# Patient Record
Sex: MALE | Race: WHITE
[De-identification: names, ages, dates, MRNs, and addresses within clinical notes are randomized per-mention and may not be internally consistent; named-entity substitution may affect disease eponyms.]

---

## 2018-09-23 ENCOUNTER — HOSPITAL ENCOUNTER (EMERGENCY)
Dept: HOSPITAL 25 - UCEAST | Age: 33
Discharge: HOME | End: 2018-09-23
Payer: COMMERCIAL

## 2018-09-23 VITALS — SYSTOLIC BLOOD PRESSURE: 138 MMHG | DIASTOLIC BLOOD PRESSURE: 98 MMHG

## 2018-09-23 DIAGNOSIS — H57.11: Primary | ICD-10-CM

## 2018-09-23 PROCEDURE — G0463 HOSPITAL OUTPT CLINIC VISIT: HCPCS

## 2018-09-23 PROCEDURE — 99212 OFFICE O/P EST SF 10 MIN: CPT

## 2018-09-23 NOTE — UC
Eye Complaint HPI





- HPI Summary


HPI Summary: 





This patient is a 33 year old M presenting to [facility] with a chief complaint of a 

scratching feeling in his right eye that began .The patient rates the pain 2/10 

in severity. Symptoms aggravated when the eye is closed. Patient denies itching

, vision changes, known foreign body fever, shakes, chills, redness of the eye

, and discharge. 





- History of Current Complaint


Stated Complaint: R EYE COMPLAINT


Hx Obtained From: Patient


Onset/Duration: Lasting Days, Still Present


Timing: Constant


Severity Initially: Mild


Severity Currently: Mild


Pain Intensity: 2


Pain Scale Used: 0-10 Numeric


Location of Injury: Conjunctiva


Associated Signs And Symptoms: Positive: Negative - itching, vision changes, 

known foreign body fever, shakes, chills, redness of the eye, and discharge.





- Allergies/Home Medications


Allergies/Adverse Reactions: 


 Allergies











Allergy/AdvReac Type Severity Reaction Status Date / Time


 


SEASONAL Allergy Mild Sneezing Uncoded 09/23/18 07:26











Home Medications: 


 Home Medications





Ibuprofen TAB* [Motrin TAB* 800 MG] 1 tab PO TID PRN 09/23/18 [History 

Confirmed 09/23/18]











PMH/Surg Hx/FS Hx/Imm Hx





- Additional Past Medical History


Additional PMH: 





ear infections as a child 


Other History Of: 


   Negative For: HIV, Hepatitis B, Hepatitis C





- Surgical History


Surgical History: None





- Family History


Known Family History: Positive: Diabetes


   Negative: Seizure Disorder, Blood Disorder





- Social History


Occupation: Employed Full-time - [employer]


Alcohol Use: Rare


Substance Use Type: None


Smoking Status (MU): Never Smoked Tobacco





Review of Systems


Constitutional: Negative - fever


Eyes: Other - right eye pain


All Other Systems Reviewed And Are Negative: Yes





Physical Exam





- Summary


Physical Exam Summary: 





Appearance: Well appearing, no pain distress


Skin: warm, dry, reflects adequate perfusion


Head/face: normal


Eyes: pupils are mid range and reactive. There is no redness or injection 


ENT: mucous membranes moist


Neck: supple, non-tender


Respiratory: CTA, breath sounds present


Cardiovascular: RRR, pulses symmetrical 


Abdomen: non-tender, soft


Bowel Sounds: present


Musculoskeletal: normal, strength/ROM intact


Neuro: normal, sensory motor intact, A&Ox3


 





Triage Information Reviewed: Yes


Vital Signs Reviewed: Yes





Procedures





- Procedure Summary


Procedure Summary: 








Tetra Rhys drops and fluorescence stain used. Everted the lids and there was 

no foreign body seen. No fluorescence uptake. After application of tetra rhys 

the pain has resolved.  








Eye Complaint Course/Dx





- Course


Course Of Treatment: Minor discomfort in the right eye.  Does not wear contact 

lenses.  No corneal foreign body, no dendrites.  No foreign body found with lid 

eversion.  No fluorescein uptake.  No evidence for stye or even conjunctival 

injection.  Irrigated the eye.  Will use topical antibiotic.





- Differential Dx/Diagnosis


Differential Diagnosis/HQI/PQRI: Conjunctivitis, Corneal Abrasion, Foreign Body

, Keratitis, Periorbital Cellulitis, Uveitis


Provider Diagnoses: right eye pain





Discharge





- Sign-Out/Discharge


Documenting (check all that apply): Patient Departure


All imaging exams completed and their final reports reviewed: No Studies





- Discharge Plan


Condition: Improved


Disposition: HOME


Prescriptions: 


Erythromycin OPTH OINT* [Erythromycin 0.5% OPTH OINT*] 1 applic RIGHT EYE TID #

1 ophth.oint


Patient Education Materials:  Eye Pain (ED)


Referrals: 


Sandra Le MD [Primary Care Provider] - 


Juan Stallworth MD [Medical Doctor] - 


Additional Instructions: 


Call the eye doctor first thing tomorrow to follow-up.  Return with increased 

pain, difficulty with vision, new symptoms or other concerns.





- Billing Disposition and Condition


Condition: IMPROVED


Disposition: Home





- Attestation Statements


Document Initiated by Nolan: Yes


Documenting Scribe: Robbie Brown 


Provider For Whom Scribe is Documenting (Include Credential): Sean Santos MD 


Scribe Attestation: 


Robbie HARRIS , scribed for Sean Santos MD  on 09/23/18 at 0928. 


Scribe Documentation Reviewed: Yes


Provider Attestation: 


The documentation as recorded by the Robbie zabala  accurately reflects 

the service I personally performed and the decisions made by me, Sean Santos MD